# Patient Record
Sex: MALE | Race: WHITE | NOT HISPANIC OR LATINO | Employment: FULL TIME | URBAN - METROPOLITAN AREA
[De-identification: names, ages, dates, MRNs, and addresses within clinical notes are randomized per-mention and may not be internally consistent; named-entity substitution may affect disease eponyms.]

---

## 2021-04-01 ENCOUNTER — TRANSCRIBE ORDERS (OUTPATIENT)
Dept: ADMINISTRATIVE | Facility: HOSPITAL | Age: 36
End: 2021-04-01

## 2021-04-01 DIAGNOSIS — R51.9 HEADACHE, UNSPECIFIED: Primary | ICD-10-CM

## 2021-04-02 ENCOUNTER — HOSPITAL ENCOUNTER (OUTPATIENT)
Dept: RADIOLOGY | Age: 36
Discharge: HOME | End: 2021-04-02
Payer: COMMERCIAL

## 2021-04-02 DIAGNOSIS — R51.9 HEADACHE, UNSPECIFIED: ICD-10-CM

## 2021-04-02 PROCEDURE — 70551 MRI BRAIN STEM W/O DYE: CPT

## 2021-04-06 DIAGNOSIS — Z23 ENCOUNTER FOR IMMUNIZATION: ICD-10-CM

## 2021-04-27 ENCOUNTER — EVALUATION (OUTPATIENT)
Dept: PHYSICAL THERAPY | Facility: CLINIC | Age: 36
End: 2021-04-27
Payer: COMMERCIAL

## 2021-04-27 DIAGNOSIS — H81.11 BPPV (BENIGN PAROXYSMAL POSITIONAL VERTIGO), RIGHT: Primary | ICD-10-CM

## 2021-04-27 PROCEDURE — 97161 PT EVAL LOW COMPLEX 20 MIN: CPT | Performed by: PHYSICAL THERAPIST

## 2021-04-27 NOTE — PROGRESS NOTES
PT Evaluation     Today's date: 2021  Patient name: Pearl Arevalo  : 1985  MRN: [id number]  Referring provider: Maria R Suarez DO  Dx:   Encounter Diagnosis     ICD-10-CM    1  BPPV (benign paroxysmal positional vertigo), right  H81 11                   Assessment  Assessment details: Pearl Arevalo is a 30 y o  male presenting to physical therapy with vertigo symptoms and presents with dizziness, gait/balance dysfunction and decreased activity tolerance  Assessment indicates right sided BPPV of the posterior canalithiasis  Secondary to these impairments, patient has increased difficulty performing ADL's, household chores and  work related tasks  Taya would benefit from skilled PT to address these issues and maximize function  Thank you for the referral     Impairments: abnormal gait, abnormal or restricted ROM, abnormal movement, activity intolerance, impaired balance, lacks appropriate home exercise program and poor posture   Understanding of Dx/Px/POC: excellent  Goals  STG (4 weeks)  1  Patient will be independent with HEP   2  Decrease dizziness frequency by 50%   3  No gait deviation present with ambulation with head turns  LTG (8 weeks)  1  No gait deviation present with all head turns during ambulation  2  Abolish dizziness with regards to positional changes   3  Increase FOTO > or equal to expected outcomes    Plan  Patient would benefit from: skilled PT  Planned therapy interventions: manual therapy, neuromuscular re-education, patient education, postural training, strengthening, stretching, therapeutic exercise, home exercise program, functional ROM exercises, ADL training, balance, therapeutic activities and graded exercise  Frequency: 2x week  Duration in weeks: 8  Treatment plan discussed with: patient        Subjective Evaluation    History of Present Illness  Mechanism of injury: Patient presents to outpatient PT with recurrent episodes of dizziness    Patient states that the symptoms are worsened by positional changes including bed mobility and supine to sit in bed  Patient describes the symptoms as dizziness and lightheadedness that resolve within 5-10 seconds  Patient works as a  which has been virtual and primarily desk work  Patient states that the symptoms limits his ADL and leisure function  Patients goals for PT are to decrease symptoms occurrence and resume normal activities  Recurrent probem    Quality of life: excellent    Pain  No pain reported  Relieving factors: rest and relaxation  Exacerbated by: cervical positional changes  Progression: worsening    Social Support  Steps to enter house: yes  Stairs in house: yes   Lives in: multiple-level home    Employment status: working    Diagnostic Tests  MRI studies: normal  Treatments  No previous or current treatments  Patient Goals  Patient goals for therapy: independence with ADLs/IADLs, return to sport/leisure activities and improved balance          Objective     Concurrent Complaints  Positive for nausea/motion sickness  Negative for tinnitus, visual change, hearing loss and aural fullness  Neuro Exam:     Dizziness  Positive for vertigo and light-headedness  Negative for oscillopsia, motion sickness, rocking or swaying, diplopia and floating or swimming  Exacerbating factors  Positive for rolling in bed, looking up and supine to/from sitting  Negative for turning head and optokinetic movement  Cervical exam   Ligament Laxity Testing   Alar ligament: WNL  Sharp Jodee:  WNL  Modified VBI   Left: asymptomatic  Right: asymptomatic    Oculomotor exam   Oculomotor ROM: WNL  Resting nystagmus: not present   Gaze holding nystagmus: not present left  and not present right  Smooth pursuits: within normal limits  Vertical saccades: normal  Horizontal saccades: normal  Convergence: normal  Head thrust: left normal and right normal    Positional testing   O'Kean-Hallpike   Left posterior canal: WNL  Right posterior canal: symptomatic, torsional and upbeating  Roll test   Left horizontal canal: WNL  Right horizontal canal: WNL             Precautions: Dizziness, Crohns disease      Manuals 4/27                                                                Neuro Re-Ed                                                                                                        Ther Ex                                                                                                                     Ther Activity                                       Gait Training                                       Modalities

## 2023-01-19 ENCOUNTER — EVALUATION (OUTPATIENT)
Dept: PHYSICAL THERAPY | Facility: CLINIC | Age: 38
End: 2023-01-19

## 2023-01-19 DIAGNOSIS — H81.12 BPPV (BENIGN PAROXYSMAL POSITIONAL VERTIGO), LEFT: Primary | ICD-10-CM

## 2023-01-19 NOTE — PROGRESS NOTES
PT Evaluation     Today's date: 2023  Patient name: Suresh Barnes  : 1985  MRN: [id number]  Referring provider: Mitra Wells DO  Dx:   Encounter Diagnosis     ICD-10-CM    1  BPPV (benign paroxysmal positional vertigo), left  H81 12           Start Time: 0950  Stop Time: 1030  Total time in clinic (min): 40 minutes    Assessment  Assessment details: Suresh Barnes is a 40 y o  male presenting to physical therapy with vertigo symptoms and presents with dizziness, gait/balance dysfunction and decreased activity tolerance  Assessment indicates left posterior and horizontal canal involvement indicating BPPV  Secondary to these impairments, patient has increased difficulty performing ADL's, household chores and  work related tasks  Taya would benefit from skilled PT to address these issues and maximize function  Thank you for the referral     Impairments: abnormal gait, abnormal or restricted ROM, abnormal movement, activity intolerance, impaired balance, lacks appropriate home exercise program and poor posture   Understanding of Dx/Px/POC: excellent  Goals  STG (4 weeks)  1  Patient will be independent with HEP   2  Decrease dizziness frequency by 50%   3  No dizziness associated with left side lying  LTG (8 weeks)  1  Abolish dizziness with regards to positional changes   2  Increase FOTO > or equal to expected outcomes  3   Patient will be independent with his comprehensive HEP    Plan  Patient would benefit from: skilled PT  Planned therapy interventions: manual therapy, neuromuscular re-education, patient education, postural training, strengthening, stretching, therapeutic exercise, home exercise program, functional ROM exercises, ADL training, balance, therapeutic activities and graded exercise  Frequency: 1x week  Duration in weeks: 4  Treatment plan discussed with: patient        Subjective Evaluation    History of Present Illness  Mechanism of injury: Patient presents to outpatient PT secondary to the onset of vertigo symptoms  Patient states that his symptoms began when 2 weeks ago with rolling to the left  Patient describes the symptoms as room spinning dizziness which occur with positional changes and bed mobility  Patient works as a  and notes difficulty with his normal ADL's, leisure functions and work duties as a result  Patients goals for PT are to resolve the dizziness and return to PLOF  Recurrent probem    Quality of life: excellent    Pain  No pain reported  Relieving factors: rest and relaxation  Exacerbated by: positional changes  Social Support  Steps to enter house: yes  Stairs in house: yes   Lives in: multiple-level home    Employment status: working    Diagnostic Tests  No diagnostic tests performed  Treatments  No previous or current treatments  Patient Goals  Patient goals for therapy: return to sport/leisure activities, independence with ADLs/IADLs and improved balance  Patient goal: Abolish dizziness        Objective     Concurrent Complaints  Negative for headaches, nausea/motion sickness, tinnitus, visual change, hearing loss, memory loss, aural fullness and poor concentration  Neuro Exam:     Dizziness  Positive for vertigo and light-headedness  Negative for disequilibrium, oscillopsia, motion sickness, rocking or swaying, diplopia and floating or swimming  Exacerbating factors  Positive for rolling in bed and supine to/from sitting  Negative for bending over, looking up, walking, turning head, optokinetic movement and walking in busy environment       Headaches   Patient reports headaches: No      Cervical exam   Modified VBI   Left: asymptomatic  Right: asymptomatic    Oculomotor exam   Oculomotor ROM: WNL  Resting nystagmus: not present   Gaze holding nystagmus: not present left  and not present right  Smooth pursuits: within normal limits  Vertical saccades: normal  Horizontal saccades: normal  Convergence: normal  Head thrust: left normal and right normal    Positional testing   Josh-Hallpike   Left posterior canal: symptomatic and torsional  Right posterior canal: WNL  Roll test   Left horizontal canal: ageotropic and symptomatic             Precautions: Vertigo, Crohns disease      Manuals 1/19            L epley performed            L BBQ roll performed                                      Neuro Re-Ed                                                                                                        Ther Ex                                                                                                                     Ther Activity                                       Gait Training                                       Modalities

## 2024-04-19 PROBLEM — Z00.00 ENCOUNTER FOR PREVENTIVE HEALTH EXAMINATION: Status: ACTIVE | Noted: 2024-04-19

## 2024-04-25 ENCOUNTER — APPOINTMENT (OUTPATIENT)
Dept: NEUROSURGERY | Facility: CLINIC | Age: 39
End: 2024-04-25
Payer: COMMERCIAL

## 2024-04-25 VITALS
SYSTOLIC BLOOD PRESSURE: 124 MMHG | BODY MASS INDEX: 22.73 KG/M2 | DIASTOLIC BLOOD PRESSURE: 83 MMHG | HEART RATE: 58 BPM | WEIGHT: 150 LBS | HEIGHT: 68 IN | OXYGEN SATURATION: 98 %

## 2024-04-25 DIAGNOSIS — Z86.59 PERSONAL HISTORY OF OTHER MENTAL AND BEHAVIORAL DISORDERS: ICD-10-CM

## 2024-04-25 DIAGNOSIS — M47.816 SPONDYLOSIS W/OUT MYELOPATHY OR RADICULOPATHY, LUMBAR REGION: ICD-10-CM

## 2024-04-25 DIAGNOSIS — Z87.19 PERSONAL HISTORY OF OTHER DISEASES OF THE DIGESTIVE SYSTEM: ICD-10-CM

## 2024-04-25 PROCEDURE — 99203 OFFICE O/P NEW LOW 30 MIN: CPT

## 2024-04-25 RX ORDER — MELOXICAM 15 MG/1
15 TABLET ORAL
Qty: 30 | Refills: 0 | Status: ACTIVE | COMMUNITY
Start: 2024-04-25 | End: 1900-01-01

## 2024-04-25 RX ORDER — BUPROPION HYDROCHLORIDE 150 MG/1
150 TABLET, FILM COATED ORAL
Refills: 0 | Status: ACTIVE | COMMUNITY

## 2024-05-03 ENCOUNTER — TRANSCRIPTION ENCOUNTER (OUTPATIENT)
Age: 39
End: 2024-05-03

## 2025-06-20 DIAGNOSIS — Z00.6 ENCOUNTER FOR EXAMINATION FOR NORMAL COMPARISON OR CONTROL IN CLINICAL RESEARCH PROGRAM: ICD-10-CM
